# Patient Record
Sex: FEMALE | Race: BLACK OR AFRICAN AMERICAN | NOT HISPANIC OR LATINO
[De-identification: names, ages, dates, MRNs, and addresses within clinical notes are randomized per-mention and may not be internally consistent; named-entity substitution may affect disease eponyms.]

---

## 2020-06-18 PROBLEM — Z00.00 ENCOUNTER FOR PREVENTIVE HEALTH EXAMINATION: Status: ACTIVE | Noted: 2020-06-18

## 2020-07-02 ENCOUNTER — APPOINTMENT (OUTPATIENT)
Dept: OBGYN | Facility: CLINIC | Age: 55
End: 2020-07-02
Payer: COMMERCIAL

## 2020-07-02 ENCOUNTER — ASOB RESULT (OUTPATIENT)
Age: 55
End: 2020-07-02

## 2020-07-02 ENCOUNTER — TRANSCRIPTION ENCOUNTER (OUTPATIENT)
Age: 55
End: 2020-07-02

## 2020-07-02 VITALS
TEMPERATURE: 98.4 F | WEIGHT: 187 LBS | BODY MASS INDEX: 29.35 KG/M2 | DIASTOLIC BLOOD PRESSURE: 102 MMHG | HEART RATE: 81 BPM | HEIGHT: 67 IN | SYSTOLIC BLOOD PRESSURE: 161 MMHG

## 2020-07-02 DIAGNOSIS — Z86.72 PERSONAL HISTORY OF THROMBOPHLEBITIS: ICD-10-CM

## 2020-07-02 PROCEDURE — 76830 TRANSVAGINAL US NON-OB: CPT

## 2020-07-02 PROCEDURE — 99386 PREV VISIT NEW AGE 40-64: CPT

## 2020-07-02 NOTE — COUNSELING
[Breast Self Exam] : breast self exam [Nutrition] : nutrition [Exercise] : exercise [Vitamins/Supplements] : vitamins/supplements [Menstrual Calendar] : menstrual calendar [Bladder Hygiene] : bladder hygiene

## 2020-07-08 LAB
CYTOLOGY CVX/VAG DOC THIN PREP: ABNORMAL
HPV HIGH+LOW RISK DNA PNL CVX: NOT DETECTED

## 2022-10-12 ENCOUNTER — APPOINTMENT (OUTPATIENT)
Dept: OBGYN | Facility: CLINIC | Age: 57
End: 2022-10-12

## 2022-10-12 VITALS
DIASTOLIC BLOOD PRESSURE: 110 MMHG | HEART RATE: 99 BPM | SYSTOLIC BLOOD PRESSURE: 188 MMHG | WEIGHT: 186 LBS | HEIGHT: 67 IN | BODY MASS INDEX: 29.19 KG/M2

## 2022-10-12 PROCEDURE — 99396 PREV VISIT EST AGE 40-64: CPT

## 2022-10-19 LAB
CYTOLOGY CVX/VAG DOC THIN PREP: ABNORMAL
HPV HIGH+LOW RISK DNA PNL CVX: NOT DETECTED

## 2023-10-12 ENCOUNTER — APPOINTMENT (OUTPATIENT)
Dept: ORTHOPEDIC SURGERY | Facility: CLINIC | Age: 58
End: 2023-10-12
Payer: MEDICAID

## 2023-10-12 PROCEDURE — 99204 OFFICE O/P NEW MOD 45 MIN: CPT

## 2023-10-12 RX ORDER — METHYLPREDNISOLONE 4 MG/1
4 TABLET ORAL
Qty: 1 | Refills: 0 | Status: ACTIVE | COMMUNITY
Start: 2023-10-12 | End: 1900-01-01

## 2023-12-14 ENCOUNTER — APPOINTMENT (OUTPATIENT)
Dept: ORTHOPEDIC SURGERY | Facility: CLINIC | Age: 58
End: 2023-12-14
Payer: MEDICAID

## 2023-12-14 DIAGNOSIS — M51.37 OTHER INTERVERTEBRAL DISC DEGENERATION, LUMBOSACRAL REGION: ICD-10-CM

## 2023-12-14 DIAGNOSIS — M51.36 OTHER INTERVERTEBRAL DISC DEGENERATION, LUMBAR REGION: ICD-10-CM

## 2023-12-14 DIAGNOSIS — M47.817 SPONDYLOSIS W/OUT MYELOPATHY OR RADICULOPATHY, LUMBOSACRAL REGION: ICD-10-CM

## 2023-12-14 DIAGNOSIS — M43.16 SPONDYLOLISTHESIS, LUMBAR REGION: ICD-10-CM

## 2023-12-14 DIAGNOSIS — M48.061 SPINAL STENOSIS, LUMBAR REGION WITHOUT NEUROGENIC CLAUDICATION: ICD-10-CM

## 2023-12-14 PROCEDURE — 99214 OFFICE O/P EST MOD 30 MIN: CPT

## 2023-12-14 RX ORDER — MELOXICAM 15 MG/1
15 TABLET ORAL DAILY
Qty: 30 | Refills: 0 | Status: ACTIVE | COMMUNITY
Start: 2023-12-14 | End: 1900-01-01

## 2023-12-14 NOTE — PHYSICAL EXAM
[TextEntry] : Constitutional: Well groomed and developed.  Respiratory: Normal, unlabored breathing. No use of accessory muscles.  Skin: No rashes or ulcers. Skin warm and dry.  Psychiatric: Oriented to time, place, person and event. No acute distress. Gait: Heel to toe Patient able to walk on toes and heels.    Lumbar spine:  Posture: Normal on coronal and sagittal alignment  AROM:  Flexion 90 Extension 20 Moderate pain with simulated truncal motion   Tenderness:  Thoracic: No tenderness on palpation  Lumbar: Moderate tenderness on palpation  Sacrum/coccyx: no tenderness on palpation  Greater trochanteric bursa:  No tenderness  PSIS: None    Motor:                         R             L LE:                                IS                    5             5 Quad              5             4+ TA                  5             5 EHL                5             5 Gastroc         5             5                 R  L DTR: Patella  2+  2+ Achilles  2+  2+  Sensory: Light touch sensation intact T12-S1  Babinski's Sign: Negative bilaterally  Straight leg raise test: Negative bilaterally  BHARATH test: negative bilaterally

## 2023-12-14 NOTE — HISTORY OF PRESENT ILLNESS
[Lower back] : lower back [8] : 8 [3] : 3 [Tightness] : tightness [] : yes [Constant] : constant [de-identified] : Patient is here today for her lower back. States her lower back has been bothering her for about 2 years. States she is going to PT 2x a week with relief. Reports she is not having the radiating pain going down her right leg anymore.  States the right side of her lower back bothers her the most. Denies taking the Medrol dose pack.   todays pain: 6/10 [FreeTextEntry7] : right leg

## 2023-12-14 NOTE — ASSESSMENT
[FreeTextEntry1] : Patient presents today for evaluation of her lumbar spine. Patient has undergone 6 weeks of physical therapy 2x per week since her last visit with moderate relief in her symptoms. She has also been following an HEP detailed below from her last visit. She has also been advised to take NSAIDs and Medrol to relieve her pain. However, there is lower extremity weakness of physical exam suggestive of nerve involvement. Discussed with patient that we need an MRI to further evaluate.   - Patient given prescription for MRI, follow up after study is completed to discuss results.  Patient has been doing a home exercise plan based on exercises given on their last office visit.  These exercises include calf stretching, hamstring stretching, hip flexor stretching, hip rotator stretch, quad stretching, curl ups, bridges, prone press up, knee lift leg reach and wall slide.  Patient has been doing these exercises for over 6 weeks, roughly 4 times a week for 30 minutes daily.  Patient is still experiencing low back pain with radiculopathy.   - Continue physical therapy to regain range of motion, strengthening and symptomatic improvement. Prescription given in office today.   - Patient given prescription for Meloxicam 15mg today. Advised patient to take once a day with food and to discontinue usage of other NSAIDs concurrently. Educated patient on potential adverse effects of long term NSAID use, including development of gastric ulcers and renal injury.    - Recommend NSAIDs PRN - Recommend heating pad use to decrease muscle spasm - Discussed the importance of home exercises, including but not limited to hamstring stretching and core strengthening   Patient was educated on their diagnosis today. All questions answered and patient expressed understanding.   Follow up after MRI

## 2023-12-27 ENCOUNTER — RESULT REVIEW (OUTPATIENT)
Age: 58
End: 2023-12-27

## 2024-01-02 ENCOUNTER — APPOINTMENT (OUTPATIENT)
Dept: OBGYN | Facility: CLINIC | Age: 59
End: 2024-01-02
Payer: MEDICAID

## 2024-01-02 VITALS
DIASTOLIC BLOOD PRESSURE: 121 MMHG | HEART RATE: 101 BPM | WEIGHT: 178 LBS | HEIGHT: 67 IN | BODY MASS INDEX: 27.94 KG/M2 | SYSTOLIC BLOOD PRESSURE: 206 MMHG

## 2024-01-02 VITALS — HEART RATE: 96 BPM

## 2024-01-02 DIAGNOSIS — N76.4 ABSCESS OF VULVA: ICD-10-CM

## 2024-01-02 PROCEDURE — 99213 OFFICE O/P EST LOW 20 MIN: CPT

## 2024-01-02 NOTE — PHYSICAL EXAM
[Labia Majora] : normal [Labia Minora] : normal [Normal] :  normal [FreeTextEntry1] : left crural fold area of skin slightly indurated in final stage of healing. no exudate for inflammation noted.

## 2024-01-02 NOTE — HISTORY OF PRESENT ILLNESS
[FreeTextEntry1] : pt presents for follow up of  Urgent Care visit. in [facility]  ( [facility]). pt describes ( and has pictures on her phone) of an intertriginous inflammatory lesion in left crural fold. pt has been placing hot compresses and has been taking prescribed  Amoxicillin and area has improved and now presents for recommended f/u . pt feels well today.

## 2024-01-02 NOTE — DISCUSSION/SUMMARY
[FreeTextEntry1] : A - vulvar abscess almost completely healed- most likely secondary to shaving  P- pt advised on shaving and depilatory use and caution and proper hygiene for area when shaving or using a depilatory      pt advised to finish out script for Amoxicillin,      pt advised that  area should be back to normal skin withing the week.      f/u prn.

## 2024-04-15 ENCOUNTER — APPOINTMENT (OUTPATIENT)
Dept: OTOLARYNGOLOGY | Facility: CLINIC | Age: 59
End: 2024-04-15
Payer: MEDICAID

## 2024-04-15 VITALS — DIASTOLIC BLOOD PRESSURE: 114 MMHG | SYSTOLIC BLOOD PRESSURE: 170 MMHG | HEART RATE: 85 BPM

## 2024-04-15 DIAGNOSIS — Z78.9 OTHER SPECIFIED HEALTH STATUS: ICD-10-CM

## 2024-04-15 DIAGNOSIS — R22.1 LOCALIZED SWELLING, MASS AND LUMP, NECK: ICD-10-CM

## 2024-04-15 PROCEDURE — 99203 OFFICE O/P NEW LOW 30 MIN: CPT

## 2024-04-15 NOTE — HISTORY OF PRESENT ILLNESS
[de-identified] : 57 y/o F with a h/o bilateral submandibular swelling for the past 3 to 4 years.  She reports no pain or other symptoms.

## 2024-04-15 NOTE — PHYSICAL EXAM
[Midline] : trachea located in midline position [Normal] : no mass and no adenopathy [de-identified] : Bilateral ptotic SMG's

## 2024-04-15 NOTE — CONSULT LETTER
[Dear  ___] : Dear  [unfilled], [Consult Letter:] : I had the pleasure of evaluating your patient, [unfilled]. [Please see my note below.] : Please see my note below. [Consult Closing:] : Thank you very much for allowing me to participate in the care of this patient.  If you have any questions, please do not hesitate to contact me. [Sincerely,] : Sincerely, [FreeTextEntry2] : Starla Mccormick MD [FreeTextEntry3] : Mitchel Levi MD, FACS  Chief of Otolaryngology at [facility]    Dept. of Otolaryngology  [facility]  June Scanlon [facility], PA-C Department of Otolaryngology [facility] Otolaryngology at [place]

## 2024-04-16 ENCOUNTER — APPOINTMENT (OUTPATIENT)
Dept: OBGYN | Facility: CLINIC | Age: 59
End: 2024-04-16

## 2024-05-07 ENCOUNTER — APPOINTMENT (OUTPATIENT)
Dept: OBGYN | Facility: CLINIC | Age: 59
End: 2024-05-07
Payer: MEDICAID

## 2024-05-07 VITALS
HEIGHT: 67 IN | HEART RATE: 97 BPM | WEIGHT: 174 LBS | DIASTOLIC BLOOD PRESSURE: 104 MMHG | SYSTOLIC BLOOD PRESSURE: 171 MMHG | BODY MASS INDEX: 27.31 KG/M2

## 2024-05-07 VITALS — DIASTOLIC BLOOD PRESSURE: 97 MMHG | SYSTOLIC BLOOD PRESSURE: 153 MMHG

## 2024-05-07 DIAGNOSIS — Z01.419 ENCOUNTER FOR GYNECOLOGICAL EXAMINATION (GENERAL) (ROUTINE) W/OUT ABNORMAL FINDINGS: ICD-10-CM

## 2024-05-07 PROCEDURE — 96127 BRIEF EMOTIONAL/BEHAV ASSMT: CPT

## 2024-05-07 PROCEDURE — 99396 PREV VISIT EST AGE 40-64: CPT

## 2024-06-01 LAB
CYTOLOGY CVX/VAG DOC THIN PREP: NORMAL
HPV HIGH+LOW RISK DNA PNL CVX: NOT DETECTED

## 2024-06-01 NOTE — PHYSICAL EXAM
[Chaperone Present] : A chaperone was present in the examining room during all aspects of the physical examination [Appropriately responsive] : appropriately responsive [Alert] : alert [No Acute Distress] : no acute distress [No Lymphadenopathy] : no lymphadenopathy [Soft] : soft [Non-tender] : non-tender [Non-distended] : non-distended [No HSM] : No HSM [No Lesions] : no lesions [No Mass] : no mass [Oriented x3] : oriented x3 [Examination Of The Breasts] : a normal appearance [No Discharge] : no discharge [No Masses] : no breast masses were palpable [Labia Majora] : normal [Labia Minora] : normal [Normal] : normal [Uterine Adnexae] : normal [40973] : A chaperone was present during the pelvic exam. [FreeTextEntry2] : ANYA

## 2024-06-01 NOTE — PLAN
[FreeTextEntry1] : - HPV/ PAP done today. -  Referral for mammo/ breast sono given. - RTO for 1 year annual.

## 2024-06-01 NOTE — HISTORY OF PRESENT ILLNESS
[FreeTextEntry1] : 58 year year old P2 postmenopausal presents for annual gyn visit. Pt is doing well and has no complaints. She denies PMB, itching, burning and abnormal discharge. Offers no complaints regarding bowel movement or urination.  Of note, pt reports she has an ACL injury since the last visit.  [Mammogramdate] : 2024 [PapSmeardate] : 10/2022 [ColonoscopyDate] : 2020

## 2024-06-01 NOTE — DISCUSSION/SUMMARY
[FreeTextEntry1] : This note was written by Luna Rangel on 05/07/2024 actively solely Yasmine Jacobs M.D.   All medical record entries made by this scribe at my, Yasmine Jacobs M.D direction and personally dictated by me on 05/07/2024 . I have personally reviewed the chart and agree that the record reflects my personal performance of the history, physical exam, assessment, and plan.

## 2025-05-14 ENCOUNTER — APPOINTMENT (OUTPATIENT)
Dept: OBGYN | Facility: CLINIC | Age: 60
End: 2025-05-14